# Patient Record
Sex: FEMALE | Race: OTHER | URBAN - METROPOLITAN AREA
[De-identification: names, ages, dates, MRNs, and addresses within clinical notes are randomized per-mention and may not be internally consistent; named-entity substitution may affect disease eponyms.]

---

## 2022-06-08 NOTE — PATIENT DISCUSSION
suspect presentation c/w combo of CL abuse and poorly controlled dryness OS>OD. CL was also 'lost' OS for approx 1wk before pt discovered and removed.

## 2022-06-08 NOTE — PATIENT DISCUSSION
advised d/c CL wear for 1wk and start PF ATs QID OU. may cautiously resume CL wear after 1wk if doing well; reemphasized importance of proper CL wear/care.

## 2022-06-13 NOTE — PATIENT DISCUSSION
** 6/13/22: Still has rough cornea.  TOLD PT TO REMAIN OUT OF CLS UNTIL APPT IN 10 DAYS WITH DR. Schmitt Dear.

## 2022-06-23 NOTE — PATIENT DISCUSSION
** 6/13/22: Still has rough cornea.  TOLD PT TO REMAIN OUT OF CLS UNTIL APPT IN 10 DAYS WITH DR. Crow Coronel.

## 2024-09-25 ENCOUNTER — NEW PATIENT (OUTPATIENT)
Dept: URBAN - METROPOLITAN AREA CLINIC 26 | Facility: CLINIC | Age: 39
End: 2024-09-25

## 2024-09-25 VITALS
HEIGHT: 66 IN | DIASTOLIC BLOOD PRESSURE: 88 MMHG | WEIGHT: 178 LBS | SYSTOLIC BLOOD PRESSURE: 132 MMHG | BODY MASS INDEX: 28.61 KG/M2 | HEART RATE: 79 BPM

## 2024-09-25 DIAGNOSIS — H04.123: ICD-10-CM

## 2024-09-25 DIAGNOSIS — H33.313: ICD-10-CM

## 2024-09-25 DIAGNOSIS — H30.91: ICD-10-CM

## 2024-09-25 DIAGNOSIS — H33.321: ICD-10-CM

## 2024-09-25 PROCEDURE — 92250 FUNDUS PHOTOGRAPHY W/I&R: CPT | Mod: 59

## 2024-09-25 PROCEDURE — 92134 CPTRZ OPH DX IMG PST SGM RTA: CPT

## 2024-09-25 PROCEDURE — 92004 COMPRE OPH EXAM NEW PT 1/>: CPT | Mod: 25

## 2024-09-25 PROCEDURE — 67145 PROPH RTA DTCHMNT PC: CPT
